# Patient Record
Sex: MALE | Race: WHITE
[De-identification: names, ages, dates, MRNs, and addresses within clinical notes are randomized per-mention and may not be internally consistent; named-entity substitution may affect disease eponyms.]

---

## 2018-03-26 ENCOUNTER — HOSPITAL ENCOUNTER (OUTPATIENT)
Dept: HOSPITAL 88 - RAD | Age: 58
End: 2018-03-26
Attending: NEUROLOGICAL SURGERY
Payer: COMMERCIAL

## 2018-03-26 DIAGNOSIS — Z98.1: ICD-10-CM

## 2018-03-26 DIAGNOSIS — M43.16: Primary | ICD-10-CM

## 2018-03-26 PROCEDURE — 72110 X-RAY EXAM L-2 SPINE 4/>VWS: CPT

## 2018-03-26 NOTE — DIAGNOSTIC IMAGING REPORT
PROCEDURE:L-SPINE AP AND LAT WITH FLEX AND EXT.

 

COMPARISON:/21/2017

 

INDICATIONS:LUMBAR FUSION STATUS

 

FINDINGS:

There are 5 non-rib-bearing lumbar vertebral bodies. Postsurgical 

changes of L4 and L5 laminectomy with posterior transpedicular fusion 

and intervertebral disc spacer. Surgical hardware is intact, and 

alignment is unchanged. Flexion-extension radiographs show no evidence 

of inducible malalignment. Mild disc space narrowing at multiple levels 

above the fusion, most notably at L1-L2, without significant interval 

change. Sacroiliac joints are maintained.

 

Unchanged probable bone island in the right iliac wing.

 

CONCLUSION:

Stable postsurgical appearance of the lumbar spine without evidence of 

inducible malalignment or hardware failure. 

Dictated by:  Geovanny Kamara M.D. on 3/26/2018 at 9:14     

Electronically approved by:  Geovanny Kamara M.D. on 3/26/2018 at 9:14

## 2020-07-13 ENCOUNTER — HOSPITAL ENCOUNTER (OUTPATIENT)
Dept: HOSPITAL 88 - MRI | Age: 60
End: 2020-07-13
Attending: FAMILY MEDICINE
Payer: COMMERCIAL

## 2020-07-13 DIAGNOSIS — M54.10: ICD-10-CM

## 2020-07-13 DIAGNOSIS — M62.81: ICD-10-CM

## 2020-07-13 DIAGNOSIS — G89.29: ICD-10-CM

## 2020-07-13 DIAGNOSIS — M54.5: Primary | ICD-10-CM

## 2020-07-13 LAB
BUN SERPL-MCNC: 15 MG/DL (ref 7–26)
BUN/CREAT SERPL: 14 (ref 6–25)
EGFRCR SERPLBLD CKD-EPI 2021: > 60 ML/MIN (ref 60–?)

## 2020-07-13 PROCEDURE — 36415 COLL VENOUS BLD VENIPUNCTURE: CPT

## 2020-07-13 PROCEDURE — 84520 ASSAY OF UREA NITROGEN: CPT

## 2020-07-13 PROCEDURE — 82565 ASSAY OF CREATININE: CPT

## 2020-07-13 PROCEDURE — 72158 MRI LUMBAR SPINE W/O & W/DYE: CPT

## 2020-07-13 NOTE — DIAGNOSTIC IMAGING REPORT
History: Low back pain with pain in left foot and toes. Prior lumbar surgery.

Comparison studies: Lumbar spine MRI 11/15/2016. Prior lumbar spine CT of

4/3/2017 is unavailable on the PACS for comparison.



Technique: 

Sagittal and axial T2 and precontrast T1, axial oblique proton density, coronal

T2, sagittal STIR and postcontrast sagittal and axial T1 FS.

Intravenous contrast: None



Findings:



Number of lumbar vertebral bodies: 5.



Alignment: Normal lumbar lordosis with unchanged mild lumbar dextrocurvature.



Soft tissues: Postsurgical changes present in the dorsal lumbosacral soft

tissues. No fluid collection.



Dorsal paraspinal: Moderate fatty-replaced atrophic changes noted the

lumbosacral junction.



Lower thoracic cord: Normal in signal and morphology. The tip of the conus is

at L1-2.

Cauda equina: No masses.  No arachnoiditis. No abnormal enhancement.



Postsurgical changes: 

Posterior instrumented fusion and discectomy at L4-L5 are new from the prior

lumbar spine MRI. Previously present S1 transpedicular screws have been

removed. Bilateral laminectomies present at L4-L5 and L5-S1. Unchanged prior

discectomy and solid interbody fusion at L5-S1. Please note, cannot further

evaluate hardware integrity by MRI.



Vertebrae: 

No compression fractures, infection or neoplasm.



Degenerative changes:



L1-L2:

Mildly degenerated disc with small symmetric disc bulge without canal or

foraminal stenosis.



L2-L3: 

Patent canal and foramina. No disc herniation.



L3-L4:

Mildly degenerated disc with disc degeneration which has progressed from the

prior MRI. Additionally there are new endplate changes with endplate edema.

Disc bulge with new 11 mm x 6 mm x 12 mm (SI x AP x TV) left

central-subarticular disc extrusion, thickened ligamentum flavum and bilateral

facet arthrosis with mild canal stenosis and disc herniation which compresses

the left L4 subarticular nerve root. Bilateral foraminal disc osteophyte

complexes and facet arthrosis result in severe left foraminal stenosis

(previously mild) and mild right foraminal stenosis.



L4-L5:

Surgical level with prior discectomy and fatty replaced marrow endplate changes

Patent canal and foramina.



L5-S1:

Surgical level with fused disc space. Patent canal and foramina.



Incidental findings:

Unchanged small nonaggressive-appearing sclerotic lesion in the right iliac

bone.



IMPRESSION: 



1.  Changes of prior L4-L5 posterior instrumented fusion, L4-L5 and L5-S1

laminectomies and discectomies and L5-S1 fusion with interval removal of

previously present S1 transpedicular screws.

2.  Progression of degenerative changes at L3-L4 adjacent to the level of

fusion with there is progressed disc degeneration, new endplate changes with

marrow edema, new disc extrusion which compresses the left L4 subarticular

nerve root, mild canal stenosis and severe left foraminal stenosis.

3.  No significant canal or foraminal stenosis at the remaining lumbar levels.



Signed by: Dr. Geovanny Singh M.D. on 7/13/2020 1:50 PM

## 2020-07-17 LAB
ANION GAP SERPL CALC-SCNC: 11.1 MMOL/L (ref 8–16)
BASOPHILS # BLD AUTO: 0.1 10*3/UL (ref 0–0.1)
BASOPHILS NFR BLD AUTO: 1.1 % (ref 0–1)
BUN SERPL-MCNC: 14 MG/DL (ref 7–26)
BUN/CREAT SERPL: 12 (ref 6–25)
CALCIUM SERPL-MCNC: 8.7 MG/DL (ref 8.4–10.2)
CHLORIDE SERPL-SCNC: 103 MMOL/L (ref 98–107)
CO2 SERPL-SCNC: 30 MMOL/L (ref 22–29)
DEPRECATED APTT PLAS QN: 24.2 SECONDS (ref 23.8–35.5)
DEPRECATED INR PLAS: 0.96
DEPRECATED NEUTROPHILS # BLD AUTO: 6.7 10*3/UL (ref 2.1–6.9)
EGFRCR SERPLBLD CKD-EPI 2021: > 60 ML/MIN (ref 60–?)
EOSINOPHIL # BLD AUTO: 0.4 10*3/UL (ref 0–0.4)
EOSINOPHIL NFR BLD AUTO: 3.8 % (ref 0–6)
ERYTHROCYTE [DISTWIDTH] IN CORD BLOOD: 13 % (ref 11.7–14.4)
GLUCOSE SERPLBLD-MCNC: 86 MG/DL (ref 74–118)
HCT VFR BLD AUTO: 51.7 % (ref 38.2–49.6)
HGB BLD-MCNC: 16.7 G/DL (ref 14–18)
LYMPHOCYTES # BLD: 1.4 10*3/UL (ref 1–3.2)
LYMPHOCYTES NFR BLD AUTO: 14.1 % (ref 18–39.1)
MCH RBC QN AUTO: 30.2 PG (ref 28–32)
MCHC RBC AUTO-ENTMCNC: 32.3 G/DL (ref 31–35)
MCV RBC AUTO: 93.5 FL (ref 81–99)
MONOCYTES # BLD AUTO: 0.8 10*3/UL (ref 0.2–0.8)
MONOCYTES NFR BLD AUTO: 8.5 % (ref 4.4–11.3)
NEUTS SEG NFR BLD AUTO: 70.3 % (ref 38.7–80)
PLATELET # BLD AUTO: 238 X10E3/UL (ref 140–360)
POTASSIUM SERPL-SCNC: 4.1 MMOL/L (ref 3.5–5.1)
PROTHROMBIN TIME: 13.3 SECONDS (ref 11.9–14.5)
RBC # BLD AUTO: 5.53 X10E6/UL (ref 4.3–5.7)
SODIUM SERPL-SCNC: 140 MMOL/L (ref 136–145)

## 2020-07-17 NOTE — DIAGNOSTIC IMAGING REPORT
EXAMINATION:  CHEST 2 VIEWS    



INDICATION: 



Evaluation of the lungs.



COMPARISON:  None

     

FINDINGS:  



TUBES and LINES:  None.



LUNGS:  Normal lung volumes.  Lungs are clear.  No consolidations.



PLEURA:  No pleural effusion or pneumothorax.



HEART AND MEDIASTINUM:  The cardiomediastinal silhouette is unremarkable.    



BONES AND SOFT TISSUES:  No acute osseous lesion.  Soft tissues are

unremarkable.



UPPER ABDOMEN: No free air under the diaphragm. Cholecystectomy clips.



IMPRESSION:

 

Normal chest x-ray.





Signed by: Denzel Mix MD on 7/17/2020 5:42 PM

## 2020-07-23 ENCOUNTER — HOSPITAL ENCOUNTER (OUTPATIENT)
Dept: HOSPITAL 88 - OR | Age: 60
Discharge: HOME | End: 2020-07-23
Attending: NEUROLOGICAL SURGERY
Payer: COMMERCIAL

## 2020-07-23 VITALS — SYSTOLIC BLOOD PRESSURE: 126 MMHG | DIASTOLIC BLOOD PRESSURE: 82 MMHG

## 2020-07-23 DIAGNOSIS — I10: ICD-10-CM

## 2020-07-23 DIAGNOSIS — E78.5: ICD-10-CM

## 2020-07-23 DIAGNOSIS — M51.16: Primary | ICD-10-CM

## 2020-07-23 DIAGNOSIS — Z98.1: ICD-10-CM

## 2020-07-23 DIAGNOSIS — G89.29: ICD-10-CM

## 2020-07-23 DIAGNOSIS — F32.9: ICD-10-CM

## 2020-07-23 DIAGNOSIS — K21.9: ICD-10-CM

## 2020-07-23 PROCEDURE — 88304 TISSUE EXAM BY PATHOLOGIST: CPT

## 2020-07-23 PROCEDURE — 85610 PROTHROMBIN TIME: CPT

## 2020-07-23 PROCEDURE — 93005 ELECTROCARDIOGRAM TRACING: CPT

## 2020-07-23 PROCEDURE — 80048 BASIC METABOLIC PNL TOTAL CA: CPT

## 2020-07-23 PROCEDURE — 85730 THROMBOPLASTIN TIME PARTIAL: CPT

## 2020-07-23 PROCEDURE — 72020 X-RAY EXAM OF SPINE 1 VIEW: CPT

## 2020-07-23 PROCEDURE — 36415 COLL VENOUS BLD VENIPUNCTURE: CPT

## 2020-07-23 PROCEDURE — 63047 LAM FACETEC & FORAMOT LUMBAR: CPT

## 2020-07-23 PROCEDURE — 86900 BLOOD TYPING SEROLOGIC ABO: CPT

## 2020-07-23 PROCEDURE — 85025 COMPLETE CBC W/AUTO DIFF WBC: CPT

## 2020-07-23 PROCEDURE — 71046 X-RAY EXAM CHEST 2 VIEWS: CPT

## 2020-07-23 PROCEDURE — 86850 RBC ANTIBODY SCREEN: CPT

## 2020-07-23 NOTE — OPERATIVE REPORT
DATE OF PROCEDURE:  07/23/2020

 

SURGEON:  Xavier Peraza MD

 

PREOPERATIVE DIAGNOSIS:  Left L3-L4 disk herniation with radiculopathy, above the level

of previous L4-S1 fusion, M51.16. 

 

POSTOPERATIVE DIAGNOSIS:  Left L3-L4 disk herniation with radiculopathy, above the level

of previous L4-S1 fusion, M51.16. 

 

PROCEDURES:  Left L3-L4 laminotomy, medial facetectomy, and microsurgical diskectomy,

57723. 

 

ANESTHESIA:  General.

 

INDICATIONS:  The patient is a 59-year-old man, who presents with left L3-L4 disk

herniation above the level of previous L4-S1 fusion, symptomatic with an intractable L4

radiculopathy.  He was taken surgery for microsurgical diskectomy without extension of

his fusion. 

 

PROCEDURE IN DETAIL:  After induction of general anesthesia, the patient was placed on

the operating table in prone position over Tad frame.  Lumbar region was prepped and

draped in sterile fashion.  A preoperative x-ray was obtained.  A small midline incision

was created overlying the upper aspect of his previous incision scar.  The lumbar fascia

was opened to the left of midline and subperiosteal dissection was carried out to expose

the left side of the L3 lamina and the medial rim of the L3-L4 facet joint.  The medial

aspect of the L4 pedicle screw was also exposed.  A second x-ray confirmed correct

localization.  The operating microscope was brought in.  A high-speed drill equipped

with priti bur was used to drill the inferior aspect of the lamina of L3 and the

medial rim of the L3-L4 facet joint.  The ligamentum flavum was resected and the dural

sac and the left L4 traversing nerve root were exposed.  The nerve root was carefully

mobilized medially and the herniated disk material lateral to it came into view.  The

epidural veins lateral to the nerve root were bipolar coagulated and divided with micro

scissors.  The posterior longitudinal ligament was incised with a tip of a #11 blade.

The subligamentous disk herniation was retrieved and removed with a micro ball probe as

a large fragment of disk.  The opening into the annulus of the disk was visualized and

was enlarged with a #11 blade.  The loose contents of the L3-L4 disk were then evacuated

with the curette and pituitary instruments.  Excellent decompression was thus achieved.

The wound was copiously irrigated with bacitracin solution.  Meticulous hemostasis was

secured.  The retractor was removed.  Lumbar fascia was closed with 0 Vicryl suture.

Subcutaneous layer was closed with 2-0 Vicryl sutures.  The skin was closed with 3-0

Monocryl sutures in subcuticular fashion.  Steri-Strips and dressing were applied.  The

patient was awakened, extubated, and taken to Postanesthesia Care Unit in stable

condition.  No intraoperative complications were encountered.  Estimated blood loss was

10 mL. 

 

 

 

 

______________________________

Xavier Peraza MD PP/ANNE

D:  07/23/2020 10:10:12

T:  07/23/2020 12:09:48

Job #:  [id number]

## 2020-08-28 ENCOUNTER — HOSPITAL ENCOUNTER (OUTPATIENT)
Dept: HOSPITAL 88 - PT | Age: 60
LOS: 3 days | End: 2020-08-31
Attending: NEUROLOGICAL SURGERY
Payer: COMMERCIAL

## 2020-08-28 DIAGNOSIS — M62.81: ICD-10-CM

## 2020-08-28 DIAGNOSIS — M51.16: Primary | ICD-10-CM

## 2020-08-28 DIAGNOSIS — M53.86: ICD-10-CM

## 2020-09-04 ENCOUNTER — HOSPITAL ENCOUNTER (OUTPATIENT)
Dept: HOSPITAL 88 - PT | Age: 60
LOS: 26 days | End: 2020-09-30
Attending: NEUROLOGICAL SURGERY
Payer: COMMERCIAL

## 2020-09-04 DIAGNOSIS — M51.16: Primary | ICD-10-CM

## 2020-09-04 PROCEDURE — 97139 UNLISTED THERAPEUTIC PX: CPT
